# Patient Record
Sex: FEMALE | URBAN - METROPOLITAN AREA
[De-identification: names, ages, dates, MRNs, and addresses within clinical notes are randomized per-mention and may not be internally consistent; named-entity substitution may affect disease eponyms.]

---

## 2017-10-20 ENCOUNTER — APPOINTMENT (RX ONLY)
Dept: URBAN - METROPOLITAN AREA CLINIC 15 | Facility: CLINIC | Age: 72
Setting detail: DERMATOLOGY
End: 2017-10-20

## 2017-10-20 DIAGNOSIS — Z41.9 ENCOUNTER FOR PROCEDURE FOR PURPOSES OTHER THAN REMEDYING HEALTH STATE, UNSPECIFIED: ICD-10-CM

## 2017-10-20 PROCEDURE — ? COSMETIC FOLLOW-UP

## 2017-10-20 NOTE — HPI: COSMETIC FOLLOW UP
What Condition Are We Treating?: The patient suffers from Melasma and has been using different bleaching creams with good results: Hydroquinone 8% and Perle. The patient got worse with the application of Elure lotion.
What Procedure Did We Perform At The Last Visit?: Today she comes because the Hydroquinone 8% that she is using is running out and she needs a refill.

## 2017-10-20 NOTE — PROCEDURE: COSMETIC FOLLOW-UP
Detail Level: Simple
Comments (Free Text): The patient will continue applying the Hydroquinone 8% at night and the Perle QAM with the tinted sun protector.

## 2018-01-23 ENCOUNTER — APPOINTMENT (RX ONLY)
Dept: URBAN - METROPOLITAN AREA CLINIC 15 | Facility: CLINIC | Age: 73
Setting detail: DERMATOLOGY
End: 2018-01-23

## 2018-01-23 DIAGNOSIS — Z41.9 ENCOUNTER FOR PROCEDURE FOR PURPOSES OTHER THAN REMEDYING HEALTH STATE, UNSPECIFIED: ICD-10-CM

## 2018-01-23 PROCEDURE — ? RECOMMENDATIONS

## 2018-01-23 NOTE — HPI: COSMETIC FOLLOW UP
What Condition Are We Treating?: The patient is suffering from Melasma since many years ago. We have been treating her with bleaching creams such as Perle and Hydroquinone.  The patient did not tolerate
What Procedure Did We Perform At The Last Visit?: The Elure lotion produced the opposite effect that we were looking for: darker pigmentation on the Melasma patches. \\n\\nToday the patient came because the Hydroquinone 8% she was using ran out.

## 2018-01-23 NOTE — PROCEDURE: RECOMMENDATIONS
Recommendation Preamble: The following recommendations were made during the visit:
Detail Level: Detailed
Recommendations (Free Text): The patient is going to start using the Vivatia kit, alternating it with the Hydroquinone 6%. The Retinol 0.5%  from Vivatia kit the patient should apply every night as the first step. Then, she should apply the Hydroquinone 4% and 6% on top of the Retinol. \\n\\n The plan is to continue tapering the Hydroquinone's strength in order to discontinue it during the summer. During this period of the year we plan to put the patient on a Hydroquinone-free-Bleacheeze cream of [facility].

## 2018-04-26 ENCOUNTER — APPOINTMENT (RX ONLY)
Dept: URBAN - METROPOLITAN AREA CLINIC 15 | Facility: CLINIC | Age: 73
Setting detail: DERMATOLOGY
End: 2018-04-26

## 2018-04-26 DIAGNOSIS — Z41.9 ENCOUNTER FOR PROCEDURE FOR PURPOSES OTHER THAN REMEDYING HEALTH STATE, UNSPECIFIED: ICD-10-CM

## 2018-04-26 PROCEDURE — ? MEDICAL CONSULTATION: CHEMICAL PEELS

## 2018-04-26 NOTE — HPI: COSMETIC FOLLOW UP
What Condition Are We Treating?: Melasma on the face.
What Procedure Did We Perform At The Last Visit?: Patient has been using Hydroquinone to 8%, 6%, and 4% combined with Retinol and at the beginning she noticed improvement, but since some months ago she has not noticed any bleaching of the spots any more.

## 2018-04-30 ENCOUNTER — APPOINTMENT (RX ONLY)
Dept: URBAN - METROPOLITAN AREA CLINIC 15 | Facility: CLINIC | Age: 73
Setting detail: DERMATOLOGY
End: 2018-04-30

## 2018-04-30 DIAGNOSIS — L90.8 OTHER ATROPHIC DISORDERS OF SKIN: ICD-10-CM

## 2018-04-30 PROCEDURE — ? VI PEEL

## 2018-04-30 ASSESSMENT — LOCATION SIMPLE DESCRIPTION DERM: LOCATION SIMPLE: LEFT CHEEK

## 2018-04-30 ASSESSMENT — LOCATION ZONE DERM: LOCATION ZONE: FACE

## 2018-04-30 ASSESSMENT — LOCATION DETAILED DESCRIPTION DERM: LOCATION DETAILED: LEFT MEDIAL MALAR CHEEK

## 2018-04-30 NOTE — PROCEDURE: VI PEEL
Prep: The treated area was degreased with pre-peel cleanser, and vaseline was applied for protection of mucous membranes.
Price (Use Numbers Only, No Special Characters Or $): 325.00
Treatment Number: 1
Post Peel Care: After the procedure, the patient was instructed not to wash the treated area for 6-8 hours or manually remove dead skin when the peeling process starts. Patient may use OTC hydrocortisone cream for itching. Patient instructed to use the provided Retin-A wipes on the treated area on the 1st and 2nd nights.
Consent: Prior to the procedure, written consent was obtained and risks were reviewed, including but not limited to: redness, peeling, blistering, pigmentary change, scarring, infection, and pain. Patient is aware multiple treatments may be necessary to achieve the desired outcome.
Chemical Peel: VI Peel with booster
Detail Level: Zone
Post-Care Instructions: I reviewed with the patient in detail post-care instructions. Patient should avoid sun exposure and wear sun protection.

## 2018-05-14 ENCOUNTER — APPOINTMENT (RX ONLY)
Dept: URBAN - METROPOLITAN AREA CLINIC 15 | Facility: CLINIC | Age: 73
Setting detail: DERMATOLOGY
End: 2018-05-14

## 2018-05-14 DIAGNOSIS — L90.8 OTHER ATROPHIC DISORDERS OF SKIN: ICD-10-CM

## 2018-05-14 PROCEDURE — ? FACIAL

## 2018-05-14 ASSESSMENT — LOCATION SIMPLE DESCRIPTION DERM: LOCATION SIMPLE: RIGHT CHEEK

## 2018-05-14 ASSESSMENT — LOCATION ZONE DERM: LOCATION ZONE: FACE

## 2018-05-14 ASSESSMENT — LOCATION DETAILED DESCRIPTION DERM: LOCATION DETAILED: RIGHT LATERAL MANDIBULAR CHEEK

## 2018-05-14 NOTE — PROCEDURE: FACIAL
Price (Use Numbers Only, No Special Characters Or $): 00
Exfoliation Type: dermaplane
Mask Type (Optional): marita-based
Treatment Type (Optional): Deep Cleanse Treatment
Detail Level: Zone
Facial Steaming: steamed
Comments (Non-Sticky): Pt purchased [employer]â[employer] special 2 vi peels dermaplane facial for free.  Pt needs 4 tx
Extraction Method: extractor
Treatment Type Override: deep pore cleansing facial/oxygen facial

## 2018-05-25 ENCOUNTER — APPOINTMENT (RX ONLY)
Dept: URBAN - METROPOLITAN AREA CLINIC 15 | Facility: CLINIC | Age: 73
Setting detail: DERMATOLOGY
End: 2018-05-25

## 2018-05-25 DIAGNOSIS — L90.8 OTHER ATROPHIC DISORDERS OF SKIN: ICD-10-CM

## 2018-05-25 DIAGNOSIS — Z41.9 ENCOUNTER FOR PROCEDURE FOR PURPOSES OTHER THAN REMEDYING HEALTH STATE, UNSPECIFIED: ICD-10-CM

## 2018-05-25 PROCEDURE — ? VI PEEL

## 2018-05-25 PROCEDURE — ? ADDITIONAL NOTES

## 2018-05-25 ASSESSMENT — LOCATION DETAILED DESCRIPTION DERM: LOCATION DETAILED: LEFT INFERIOR CENTRAL MALAR CHEEK

## 2018-05-25 ASSESSMENT — LOCATION SIMPLE DESCRIPTION DERM: LOCATION SIMPLE: LEFT CHEEK

## 2018-05-25 ASSESSMENT — LOCATION ZONE DERM: LOCATION ZONE: FACE

## 2018-05-25 NOTE — PROCEDURE: ADDITIONAL NOTES
Additional Notes: Patient was here with concerns about brown spots on her face for the last 10 years, worst for the last 8 years. \\nNo relevant medication\\Ferny per patient: Thyroid problems (-)\\nShe had use hydroquinone, retinoids and CO2 laser, with improvement and recurrence. \\n\\nWe discussed with the patient that melasma is a hormonally mediated darkening of the skin. Triggers include birth control pills, pregnancy and sun exposure. Patient understands that epidermal pigmentation is more easily treated than mixed or dermal pigmentation. Reviewed with the patient treatment options, including: retinoids, bleaching creams, superficial chemical peels and fraxel. Aware variability in patient response and that no guarantee of length of benefit can be made. All patient's questions were answered fully and to patient satisfaction. Patient understands that the treatments are cosmetic in nature and not covered by insurance. \\n\\nIt was recommend minimizing sun exposure, wearing sunscreen and protective clothing. \\n\\nPatient was discussed with Dr Krystal Jacob and with the diagnosis impression of Etienne Manjarrez will be recruited in the [address] [place] [address] in 2-3 months, because she had a peeling done today.

## 2018-05-25 NOTE — PROCEDURE: VI PEEL
Price (Use Numbers Only, No Special Characters Or $): 325.00
Consent: Prior to the procedure, written consent was obtained and risks were reviewed, including but not limited to: redness, peeling, blistering, pigmentary change, scarring, infection, and pain. Patient is aware multiple treatments may be necessary to achieve the desired outcome.
Post Peel Care: After the procedure, the patient was instructed not to wash the treated area for 6-8 hours or manually remove dead skin when the peeling process starts. Patient may use OTC hydrocortisone cream for itching. Patient instructed to use the provided Retin-A wipes on the treated area on the 1st and 2nd nights.
Detail Level: Zone
Prep: The treated area was degreased with pre-peel cleanser, and vaseline was applied for protection of mucous membranes.
Treatment Number: 2
Chemical Peel: VI Peel with booster
Post-Care Instructions: I reviewed with the patient in detail post-care instructions. Patient should avoid sun exposure and wear sun protection.

## 2018-06-18 ENCOUNTER — APPOINTMENT (RX ONLY)
Dept: URBAN - METROPOLITAN AREA CLINIC 15 | Facility: CLINIC | Age: 73
Setting detail: DERMATOLOGY
End: 2018-06-18

## 2018-06-18 DIAGNOSIS — L90.8 OTHER ATROPHIC DISORDERS OF SKIN: ICD-10-CM

## 2018-06-18 PROCEDURE — ? VI PEEL

## 2018-06-18 ASSESSMENT — LOCATION SIMPLE DESCRIPTION DERM: LOCATION SIMPLE: LEFT CHEEK

## 2018-06-18 ASSESSMENT — LOCATION DETAILED DESCRIPTION DERM: LOCATION DETAILED: LEFT INFERIOR CENTRAL MALAR CHEEK

## 2018-06-18 ASSESSMENT — LOCATION ZONE DERM: LOCATION ZONE: FACE

## 2018-06-18 NOTE — PROCEDURE: VI PEEL
Prep: The treated area was degreased with pre-peel cleanser, followed by extractions and vaseline was applied for protection of mucous membranes.
Post-Care Instructions: I reviewed with the patient in detail post-care instructions. Patient should avoid sun exposure and wear sun protection.
Detail Level: Zone
Consent: Prior to the procedure, written consent was obtained and risks were reviewed, including but not limited to: redness, peeling, blistering, pigmentary change, scarring, infection, and pain. Patient is aware multiple treatments may be necessary to achieve the desired outcome.
Post Peel Care: After the procedure, the patient was instructed not to wash the treated area for 6-8 hours or manually remove dead skin when the peeling process starts. Patient may use OTC hydrocortisone cream for itching. Patient instructed to use the provided Retin-A wipes on the treated area on the 1st and 2nd nights.
Price (Use Numbers Only, No Special Characters Or $): 0
Treatment Number: 3
Chemical Peel: VI Peel with booster